# Patient Record
Sex: FEMALE | Race: WHITE | NOT HISPANIC OR LATINO | URBAN - METROPOLITAN AREA
[De-identification: names, ages, dates, MRNs, and addresses within clinical notes are randomized per-mention and may not be internally consistent; named-entity substitution may affect disease eponyms.]

---

## 2021-07-06 ENCOUNTER — OTHER (OUTPATIENT)
Dept: URBAN - METROPOLITAN AREA CLINIC 19 | Facility: CLINIC | Age: 56
Setting detail: DERMATOLOGY
End: 2021-07-06

## 2021-07-06 DIAGNOSIS — L57.0 ACTINIC KERATOSIS: ICD-10-CM

## 2021-07-06 PROBLEM — L82.0 INFLAMED SEBORRHEIC KERATOSIS: Status: RESOLVED | Noted: 2021-07-06

## 2021-07-06 PROCEDURE — 17110 DESTRUCT B9 LESION 1-14: CPT

## 2021-10-27 ENCOUNTER — FOLLOW-UP (OUTPATIENT)
Dept: URBAN - METROPOLITAN AREA CLINIC 19 | Facility: CLINIC | Age: 56
Setting detail: DERMATOLOGY
End: 2021-10-27

## 2021-10-27 DIAGNOSIS — L57.0 ACTINIC KERATOSIS: ICD-10-CM

## 2021-10-27 PROCEDURE — 17110 DESTRUCT B9 LESION 1-14: CPT

## 2021-10-27 PROCEDURE — 11102 TANGNTL BX SKIN SINGLE LES: CPT

## 2024-04-11 ENCOUNTER — OFFICE (OUTPATIENT)
Dept: URBAN - METROPOLITAN AREA CLINIC 67 | Facility: CLINIC | Age: 59
End: 2024-04-11

## 2024-04-11 VITALS
WEIGHT: 181 LBS | OXYGEN SATURATION: 96 % | HEART RATE: 98 BPM | DIASTOLIC BLOOD PRESSURE: 80 MMHG | HEIGHT: 66 IN | SYSTOLIC BLOOD PRESSURE: 126 MMHG

## 2024-04-11 DIAGNOSIS — K59.00 CONSTIPATION, UNSPECIFIED: ICD-10-CM

## 2024-04-11 DIAGNOSIS — R14.0 ABDOMINAL DISTENSION (GASEOUS): ICD-10-CM

## 2024-04-11 PROCEDURE — 99203 OFFICE O/P NEW LOW 30 MIN: CPT | Performed by: INTERNAL MEDICINE

## 2024-04-11 RX ORDER — POLYETHYLENE GLYCOL-3350 AND ELECTROLYTES WITH FLAVOR PACK 240; 5.84; 2.98; 6.72; 22.72 G/278.26G; G/278.26G; G/278.26G; G/278.26G; G/278.26G
POWDER, FOR SOLUTION ORAL
Qty: 1 | Refills: 0 | Status: ACTIVE
Start: 2024-04-11

## 2024-04-11 NOTE — SERVICEHPINOTES
Aileen Arauz   is seen for an initial visit today.     I reviewed notes uploaded from patient's   primary care provider  . There was no additional information pertinent to the patient's complaint that was not already obtained from patient interview. I also reviewed additional separate documentation to provide results of any serum or laboratory diagnostics, imaging, or procedures.   
br
br58-year-old female with history of COPD, tobacco abuse, hysterectomy with ureteral injury establishing care for altered bowel habits.brFor many years has had issues with altered bowel habits as well as abdominal bloating.brTypically will go 2 to 3 days without a bowel movement and then will have an urgent, large volume and often loose stool. No bloody stools.brNo significant straining with defecation but typically always feels incompletely emptied.brHas tried Metamucil without improvement.Noted multiple neuromodulators including amitriptyline, BuSpar, duloxetine, hydroxyzinebrHas never had a colonoscopybrHas a brother diagnosed with polyps at age 60Reviewed primary care recent labs notable for normal CMP, CBC, TSHPHYSICAL EXAM - General: No acute distress. Awake and conversant.- Eyes: Normal conjunctiva. No icterus.- ENT: Hearing grossly intact. No nasal discharge.- Neck: Neck is supple. No asymmetry or goiter.br- CV: No lower extremity edema. - Respiratory: Respirations are non-labored. No audible wheezing.br - Abdomen: No abdominal guarding, No abdominal distention.- Skin: No jaundice. No rashes or ulcers.- Psych: Cooperative, Appropriate mood and affect.- MSK: Normal ambulation. No upper extremity clubbing or cyanosis.- Neuro: CN II-XII grossly normal.

## 2024-04-11 NOTE — SERVICENOTES
Therapy for constipation: Take one capful of miralax as instructed by the miralax handout you received from clinic and adjust accordingly. If no bowel movement after 48 hours, take one or two 8.6mg senna tablets in the morning and one or two in the evening and continue to take miralax as above. Stop senna after having a bowel movement but can restart the cycle if you fail to have a bowel movement in 48 hours again. Miralax and senna tablets can be purchased over the counter.

## 2024-04-25 ENCOUNTER — OFFICE (OUTPATIENT)
Dept: URBAN - METROPOLITAN AREA PATHOLOGY 10 | Facility: PATHOLOGY | Age: 59
End: 2024-04-25
Payer: COMMERCIAL

## 2024-04-25 ENCOUNTER — AMBULATORY SURGICAL CENTER (OUTPATIENT)
Dept: URBAN - METROPOLITAN AREA SURGERY 19 | Facility: SURGERY | Age: 59
End: 2024-04-25
Payer: COMMERCIAL

## 2024-04-25 DIAGNOSIS — D12.3 BENIGN NEOPLASM OF TRANSVERSE COLON: ICD-10-CM

## 2024-04-25 DIAGNOSIS — Z12.11 ENCOUNTER FOR SCREENING FOR MALIGNANT NEOPLASM OF COLON: ICD-10-CM

## 2024-04-25 DIAGNOSIS — Z83.719 FAMILY HISTORY OF COLON POLYPS, UNSPECIFIED: ICD-10-CM

## 2024-04-25 PROCEDURE — 45385 COLONOSCOPY W/LESION REMOVAL: CPT | Mod: 33 | Performed by: INTERNAL MEDICINE

## 2024-04-25 PROCEDURE — 88305 TISSUE EXAM BY PATHOLOGIST: CPT | Performed by: STUDENT IN AN ORGANIZED HEALTH CARE EDUCATION/TRAINING PROGRAM
